# Patient Record
(demographics unavailable — no encounter records)

---

## 2020-07-17 NOTE — MRI
EXAM: MRI lumbar spine without contrast



HISTORY: Back pain after fall 3 weeks ago



COMPARISON: None



TECHNIQUE: Multiple planar multisequence MR images were obtained of the lumbar spine without contrast
.



FINDINGS:

The vertebral bodies demonstrate normal height and alignment without fracture or subluxation. General
ized disc desiccation and loss of intervertebral disc space height is seen.

There is a cyst in the right kidney. The paraspinal soft tissues are unremarkable.

No marrow signal abnormality is present.

The conus medullaris terminates normally at L1/2.



T12/L1: No significant posterior bulge or protrusion.  No posterior facet arthrosis.  No central viridiana
l stenosis.  No neural foraminal stenosis

L1/2: No significant posterior bulge or protrusion.  No posterior facet arthrosis.  No central canal 
stenosis.  No neural foraminal stenosis

L2/3: There is a moderate disc osteophyte complex.  No posterior facet arthrosis.  Moderate central c
anal stenosis.  Moderate bilateral neural foraminal stenosis

L3/4: There is a small disc osteophyte complex.  Mild bilateral posterior facet arthrosis.  Mild cent
ral canal stenosis.  Moderate bilateral neural foraminal stenosis

L4/5: No significant posterior bulge or protrusion.  Mild bilateral posterior facet arthrosis.  No ce
ntral canal stenosis.  Mild bilateral neural foraminal stenosis

L5/S1: There is a moderate disc osteophyte complex.  Moderate bilateral posterior facet arthrosis.  N
o central canal stenosis.  Severe right and mild left neural foraminal stenosis



IMPRESSION:

Degenerative changes of the lumbar spine as above



Reported By: Reji Mcconnell 

Electronically Signed:  7/17/2020 2:18 PM

## 2020-11-20 NOTE — CT
EXAM: CT chest without contrast per low-dose cancer screening protocol



HISTORY: History of smoking and nicotine dependence



COMPARISON: None



TECHNIQUE: Multiple contiguous axial images were obtained in a CT of the chest without contrast per l
ow-dose cancer screening protocol. Sagittal and coronal reformats were performed.



FINDINGS: 

Pulmonary nodules: No suspicious pulmonary nodules are seen. No focal infiltrates are seen.

Pleural space: No pneumothorax or pleural effusion are seen. 



Heart: The heart is normal in size. 

Mediastinum: No hilar or mediastinal lymphadenopathy appreciated on this limited noncontrast examinat
ion. There is a Mediport with its tip in the azygos vein.



Bones: No acute abnormality. Hardware is seen in the spine at the cervicothoracic junction. Degenerat
ely changes are seen in the spine.



Visualized subdiaphragmatic structures: An inferior vena cava filter is partially visualized. A lapar
oscopic gastric band is seen at the gastroesophageal junction.



IMPRESSION:

Lung RADS category 1-negative.



Reported By: Reji Mcconnell 

Electronically Signed:  11/20/2020 2:41 PM